# Patient Record
Sex: MALE | Race: OTHER | NOT HISPANIC OR LATINO | URBAN - METROPOLITAN AREA
[De-identification: names, ages, dates, MRNs, and addresses within clinical notes are randomized per-mention and may not be internally consistent; named-entity substitution may affect disease eponyms.]

---

## 2023-03-23 ENCOUNTER — EMERGENCY (EMERGENCY)
Facility: HOSPITAL | Age: 53
LOS: 1 days | Discharge: HOME | End: 2023-03-23
Attending: EMERGENCY MEDICINE
Payer: COMMERCIAL

## 2023-03-23 VITALS
DIASTOLIC BLOOD PRESSURE: 90 MMHG | OXYGEN SATURATION: 98 % | SYSTOLIC BLOOD PRESSURE: 150 MMHG | TEMPERATURE: 99 F | RESPIRATION RATE: 18 BRPM | WEIGHT: 235.01 LBS | HEART RATE: 84 BPM

## 2023-03-23 PROCEDURE — 99284 EMERGENCY DEPT VISIT MOD MDM: CPT

## 2023-03-23 RX ORDER — IBUPROFEN 200 MG
1 TABLET ORAL
Qty: 12 | Refills: 0
Start: 2023-03-23 | End: 2023-03-25

## 2023-03-23 RX ORDER — LIDOCAINE 4 G/100G
2 CREAM TOPICAL
Qty: 10 | Refills: 0
Start: 2023-03-23 | End: 2023-03-27

## 2023-03-23 RX ORDER — VALACYCLOVIR 500 MG/1
1 TABLET, FILM COATED ORAL
Qty: 21 | Refills: 0
Start: 2023-03-23 | End: 2023-03-29

## 2023-03-23 NOTE — ED PROVIDER NOTE - PHYSICAL EXAMINATION
GEN:   comfortable, in no apparent distress, AOx3  EYES:   PERRL, extra-occular movements intact  RESP:   , non-labored, speaking in full sentences  MSK:   5/5 strength, moving all extremities  SKIN:   dry.  R chest, R inner upper arm, R upper back localized vesicular rash.  NEURO:   AOx3, no focal weakness or loss of sensation, gait normal, GCS 15  PSYCH: calm, cooperative, no apparent risk to self and others GEN:   comfortable, in no apparent distress, AOx3  EYES:   PERRL, extra-occular movements intact  RESP:   , non-labored, speaking in full sentences  MSK:   5/5 strength, moving all extremities  SKIN:   dry.  R chest, R inner upper arm, R upper back localized vesicular rash following T2,T3 dermatomal pattern  NEURO:   AOx3, no focal weakness or loss of sensation, gait normal, GCS 15  PSYCH: calm, cooperative, no apparent risk to self and others

## 2023-03-23 NOTE — ED PROVIDER NOTE - NS ED ATTENDING STATEMENT MOD
This was a shared visit with the ARNAV. I reviewed and verified the documentation and independently performed the documented:

## 2023-03-23 NOTE — ED ADULT TRIAGE NOTE - CHIEF COMPLAINT QUOTE
burning pain to rash with redness to  right chest, right upper posterior arm and right upper  back since yesterday , sent in by PMD  r/o shingles

## 2023-03-23 NOTE — ED PROVIDER NOTE - OBJECTIVE STATEMENT
52-year-old male denies significant past medical surgical history presents for rash.  Patient states that he first noticed it yesterday.  Patient states that it is burning and painful in nature.  Patient denies any fevers chills or other symptoms.  Patient states that he had a virtual appointment with a care provider who instructed him to go to the ER because she could not fully evaluate rash digitally. 52-year-old male denies significant past medical surgical history presents for rash.  Patient states that he first noticed it yesterday.  Patient states that it is burning and painful in nature.  Patient denies any fevers chills or other symptoms.  Patient states that he had a virtual appointment with a care provider who instructed him to go to the ER because she could not fully evaluate rash digitally.  Denies any involvement of face, no pain in mouth or ears or eyes.

## 2023-03-23 NOTE — ED PROVIDER NOTE - CLINICAL SUMMARY MEDICAL DECISION MAKING FREE TEXT BOX
Patient with shingles rash.  Will treat with antivirals and meds for pain.  Patient to f/u with pcp and return precautions also provided.  Patient agreeable.

## 2023-03-23 NOTE — ED PROVIDER NOTE - NSFOLLOWUPINSTRUCTIONS_ED_ALL_ED_FT
Shingles    A rash on the skin.   Shingles, which is also known as herpes zoster, is an infection that causes a painful skin rash and fluid-filled blisters. It is caused by a virus.    Shingles only develops in people who:  •Have had chickenpox.      •Have been vaccinated against chickenpox. Shingles is rare in this group.        What are the causes?    Shingles is caused by varicella-zoster virus. This is the same virus that causes chickenpox. After a person is exposed to the virus, it stays in the body in an inactive (dormant) state. Shingles develops if the virus is reactivated. This can happen many years after the first (initial) exposure to the virus. It is not known what causes this virus to be reactivated.      What increases the risk?    People who have had chickenpox or received the chickenpox vaccine are at risk for shingles. Shingles infection is more common in people who:  •Are older than 60 years of age.    •Have a weakened disease-fighting system (immune system), such as people with:  •HIV (human immunodeficiency virus).      •AIDS (acquired immunodeficiency syndrome).      •Cancer.        •Are taking medicines that weaken the immune system, such as organ transplant medicines.      •Are experiencing a lot of stress.        What are the signs or symptoms?    Early symptoms of this condition include itching, tingling, and pain in an area on your skin. Pain may be described as burning, stabbing, or throbbing.    A few days or weeks after early symptoms start, a painful red rash appears. The rash is usually on one side of the body and has a band-like or belt-like pattern. The rash eventually turns into fluid-filled blisters that break open, change into scabs, and dry up in about 2–3 weeks.    At any time during the infection, you may also develop:  •A fever.      •Chills.      •A headache.      •Nausea.        How is this diagnosed?    This condition is diagnosed with a skin exam. Skin or fluid samples (a culture) may be taken from the blisters before a diagnosis is made.      How is this treated?    The rash may last for several weeks. There is not a specific cure for this condition. Your health care provider may prescribe medicines to help you manage pain, recover more quickly, and avoid long-term problems. Medicines may include:  •Antiviral medicines.      •Anti-inflammatory medicines.      •Pain medicines.      •Anti-itching medicines (antihistamines).      If the area involved is on your face, you may be referred to a specialist, such as an eye doctor (ophthalmologist) or an ear, nose, and throat (ENT) doctor (otorhinolaryngologist) to help you avoid eye problems, chronic pain, or disability.      Follow these instructions at home:    Medicines     •Take over-the-counter and prescription medicines only as told by your health care provider.      •Apply an anti-itch cream or numbing cream to the affected area as told by your health care provider.        Relieving itching and discomfort   A bathtub filled with water.   •Apply cold, wet cloths (cold compresses) to the area of the rash or blisters as told by your health care provider.      •Cool baths can be soothing. Try adding baking soda or dry oatmeal to the water to reduce itching. Do not bathe in hot water.      •Use calamine lotion as recommended by your health care provider. This is an over-the-counter lotion that helps to relieve itchiness.      Blister and rash care     •Keep your rash covered with a loose bandage (dressing). Wear loose-fitting clothing to help ease the pain of material rubbing against the rash.      •Wash your hands with soap and water for at least 20 seconds before and after you change your dressing. If soap and water are not available, use hand .      •Change your dressing as told by your health care provider.      •Keep your rash and blisters clean by washing the area with mild soap and cool water as told by your health care provider.    •Check your rash every day for signs of infection. Check for:  •More redness, swelling, or pain.      •Fluid or blood.      •Warmth.      •Pus or a bad smell.      • Do not scratch your rash or pick at your blisters. To help avoid scratching:  •Keep your fingernails clean and cut short.      •Wear gloves or mittens while you sleep, if scratching is a problem.        General instructions     •Rest as told by your health care provider.      •Wash your hands often with soap and water for at least 20 seconds. If soap and water are not available, use hand . Doing this lowers your chance of getting a bacterial skin infection.    •Before your blisters change into scabs, your shingles infection can cause chickenpox in people who have never had it or have never been vaccinated against it. To prevent this from happening, avoid contact with other people, especially:  •Babies.      •Pregnant women.      •Children who have eczema.      •Older people who have transplants.      •People who have chronic illnesses, such as cancer or AIDS.        •Keep all follow-up visits. This is important.        How is this prevented?    Getting vaccinated is the best way to prevent shingles and protect against shingles complications. If you have not been vaccinated, talk with your health care provider about getting the vaccine.      Where to find more information    •Centers for Disease Control and Prevention: www.cdc.gov        Contact a health care provider if:    •Your pain is not relieved with prescribed medicines.      •Your pain does not get better after the rash heals.    •You have any of these signs of infection:  •More redness, swelling, or pain around the rash.      •Fluid or blood coming from the rash.      •Warmth coming from your rash.      •Pus or a bad smell coming from the rash.      •A fever.          Get help right away if:    •The rash is on your face or nose.      •You have facial pain, pain around your eye area, or loss of feeling on one side of your face.      •You have difficulty seeing.      •You have ear pain or have ringing in your ear.      •You have a loss of taste.      •Your condition gets worse.        Summary    •Shingles, also known as herpes zoster, is an infection that causes a painful skin rash and fluid-filled blisters.      •This condition is diagnosed with a skin exam. Skin or fluid samples (a culture) may be taken from the blisters.      •Keep your rash covered with a loose bandage (dressing). Wear loose-fitting clothing to help ease the pain of material rubbing against the rash.      •Before your blisters change into scabs, your shingles infection can cause chickenpox in people who have never had it or have never been vaccinated against it.      This information is not intended to replace advice given to you by your health care provider. Make sure you discuss any questions you have with your health care provider.

## 2023-03-23 NOTE — ED PROVIDER NOTE - ATTENDING APP SHARED VISIT CONTRIBUTION OF CARE
Agree with NP H&P.  53 y/o male with no significant PMHx presents to ED for rash.  Rash described as burning and painful.  No fever.  Rash consistent with zoster on exam.  Will dc with supportive care and PMD follow up.

## 2023-03-23 NOTE — ED PROVIDER NOTE - PATIENT PORTAL LINK FT
You can access the FollowMyHealth Patient Portal offered by [facility] by registering at the following website: [email or web address]. By joining [employer]’s FollowMyHealth portal, you will also be able to view your health information using other applications (apps) compatible with our system.
